# Patient Record
Sex: MALE | Race: BLACK OR AFRICAN AMERICAN
[De-identification: names, ages, dates, MRNs, and addresses within clinical notes are randomized per-mention and may not be internally consistent; named-entity substitution may affect disease eponyms.]

---

## 2018-06-27 ENCOUNTER — HOSPITAL ENCOUNTER (EMERGENCY)
Dept: HOSPITAL 74 - JERFT | Age: 23
Discharge: HOME | End: 2018-06-27
Payer: COMMERCIAL

## 2018-06-27 VITALS — TEMPERATURE: 98.1 F | HEART RATE: 85 BPM | DIASTOLIC BLOOD PRESSURE: 86 MMHG | SYSTOLIC BLOOD PRESSURE: 131 MMHG

## 2018-06-27 VITALS — BODY MASS INDEX: 30.7 KG/M2

## 2018-06-27 DIAGNOSIS — J01.10: Primary | ICD-10-CM

## 2018-06-27 NOTE — PDOC
History of Present Illness





- General


Stated Complaint: Migraine Headache


Time Seen by Provider: 06/27/18 08:38





Past History





- Past Medical History


Allergies/Adverse Reactions: 


 Allergies











Allergy/AdvReac Type Severity Reaction Status Date / Time


 


pork derived (porcine) Allergy   Verified 06/27/18 08:20











Home Medications: 


Ambulatory Orders





Naproxen [Naprosyn -] 500 mg PO BID PRN #28 tablet 06/27/18 


Triamcinolone Acetonide [Nasacort] 10.8 ml NS DAILY #1 bottle 06/27/18 








CVA: No


COPD: No





- Suicide/Smoking/Psychosocial Hx


Smoking History: Never smoked


Information on smoking cessation initiated: No


Hx Alcohol Use: No


Drug/Substance Use Hx: No


Substance Use Type: None





*Physical Exam





- Vital Signs


 Last Vital Signs











Temp Pulse Resp BP Pulse Ox


 


 98.1 F   85   16   131/86   97 


 


 06/27/18 08:20  06/27/18 08:20  06/27/18 08:20  06/27/18 08:20  06/27/18 08:20














- Physical Exam


General Appearance: Yes: Nourished, Appropriately Dressed


HEENT: positive: EOMI, SAMY, Sinus Tenderness (maxilary tenderness), Other (

bilateral cerumen impaction )


Neck: negative: Tender


Respiratory/Chest: positive: Lungs Clear, Normal Breath Sounds


Cardiovascular: positive: Regular Rhythm, Regular Rate


Gastrointestinal/Abdominal: positive: Normal Bowel Sounds, Soft


Musculoskeletal: positive: Normal Inspection


Extremity: positive: Normal Capillary Refill, Normal Inspection, Normal Range 

of Motion


Integumentary: positive: Normal Color, Dry, Warm


Neurologic: positive: Fully Oriented, Alert, Normal Mood/Affect, Normal Response

, Motor Strength 5/5, Finger to Nose (intact).  negative: Facial Droop, Numbness

, Sensory Deficit, Confused, Disoriented, Depressed Affect





Medical Decision Making





- Medical Decision Making





06/27/18 08:53


cc: left ear fullness


sinus congestion, headache woke up with symptoms today


states no fever no history of headaches or migraine


denies any drug use 


will give motrin now


dc home with strict follow up with ENT and neurology 











*DC/Admit/Observation/Transfer


Diagnosis at time of Disposition: 


Acute frontal sinusitis


Qualifiers:


 Recurrence: non-recurrent Qualified Code(s): J01.10 - Acute frontal sinusitis, 

unspecified








- Discharge Dispostion


Disposition: HOME


Condition at time of disposition: Good





- Prescriptions


Prescriptions: 


Naproxen [Naprosyn -] 500 mg PO BID PRN #28 tablet


 PRN Reason: Headache


Triamcinolone Acetonide [Nasacort] 10.8 ml NS DAILY #1 bottle





- Referrals


Referrals: 


Ghulam Raymond MD [Staff Physician] - 


Angelito Velazquez MD [Staff Physician] - 





- Patient Instructions


Additional Instructions: 


drink pleanty of water at least 2 liters a day 


take naprosyn every 12hrs for headache 


use the nasocort as directed for nasal congestion 


follow with the ENT doctor for follow up 





- Post Discharge Activity

## 2019-03-21 ENCOUNTER — HOSPITAL ENCOUNTER (EMERGENCY)
Dept: HOSPITAL 74 - JER | Age: 24
Discharge: HOME | End: 2019-03-21
Payer: COMMERCIAL

## 2019-03-21 VITALS — DIASTOLIC BLOOD PRESSURE: 68 MMHG | HEART RATE: 81 BPM | TEMPERATURE: 97.6 F | SYSTOLIC BLOOD PRESSURE: 123 MMHG

## 2019-03-21 VITALS — BODY MASS INDEX: 28.8 KG/M2

## 2019-03-21 DIAGNOSIS — R11.2: Primary | ICD-10-CM

## 2019-03-21 DIAGNOSIS — F12.90: ICD-10-CM

## 2019-03-21 LAB
ALBUMIN SERPL-MCNC: 4.5 G/DL (ref 3.4–5)
ALP SERPL-CCNC: 86 U/L (ref 45–117)
ALT SERPL-CCNC: 59 U/L (ref 13–61)
ANION GAP SERPL CALC-SCNC: 9 MMOL/L (ref 8–16)
APPEARANCE UR: CLEAR
AST SERPL-CCNC: 22 U/L (ref 15–37)
BASOPHILS # BLD: 0.9 % (ref 0–2)
BILIRUB SERPL-MCNC: 0.7 MG/DL (ref 0.2–1)
BILIRUB UR STRIP.AUTO-MCNC: NEGATIVE MG/DL
BUN SERPL-MCNC: 8 MG/DL (ref 7–18)
CALCIUM SERPL-MCNC: 9.4 MG/DL (ref 8.5–10.1)
CHLORIDE SERPL-SCNC: 104 MMOL/L (ref 98–107)
CO2 SERPL-SCNC: 26 MMOL/L (ref 21–32)
COLOR UR: YELLOW
CREAT SERPL-MCNC: 0.9 MG/DL (ref 0.55–1.3)
DEPRECATED RDW RBC AUTO: 13.5 % (ref 11.9–15.9)
EOSINOPHIL # BLD: 0.4 % (ref 0–4.5)
GLUCOSE SERPL-MCNC: 81 MG/DL (ref 74–106)
HCT VFR BLD CALC: 46.7 % (ref 35.4–49)
HGB BLD-MCNC: 16.2 GM/DL (ref 11.7–16.9)
KETONES UR QL STRIP: (no result)
LEUKOCYTE ESTERASE UR QL STRIP.AUTO: NEGATIVE
LYMPHOCYTES # BLD: 18.6 % (ref 8–40)
MAGNESIUM SERPL-MCNC: 1.9 MG/DL (ref 1.8–2.4)
MCH RBC QN AUTO: 31.3 PG (ref 25.7–33.7)
MCHC RBC AUTO-ENTMCNC: 34.7 G/DL (ref 32–35.9)
MCV RBC: 90.3 FL (ref 80–96)
MONOCYTES # BLD AUTO: 5.6 % (ref 3.8–10.2)
NEUTROPHILS # BLD: 74.5 % (ref 42.8–82.8)
NITRITE UR QL STRIP: NEGATIVE
PH UR: 7.5 [PH] (ref 5–8)
PLATELET # BLD AUTO: 223 K/MM3 (ref 134–434)
PMV BLD: 9.8 FL (ref 7.5–11.1)
POTASSIUM SERPLBLD-SCNC: 3.8 MMOL/L (ref 3.5–5.1)
PROT SERPL-MCNC: 8.3 G/DL (ref 6.4–8.2)
PROT UR QL STRIP: (no result)
PROT UR QL STRIP: NEGATIVE
RBC # BLD AUTO: 5.17 M/MM3 (ref 4–5.6)
SODIUM SERPL-SCNC: 138 MMOL/L (ref 136–145)
SP GR UR: 1.03 (ref 1.01–1.03)
UROBILINOGEN UR STRIP-MCNC: 1 MG/DL (ref 0.2–1)
WBC # BLD AUTO: 7.8 K/MM3 (ref 4–10)

## 2019-03-21 PROCEDURE — 3E023NZ INTRODUCTION OF ANALGESICS, HYPNOTICS, SEDATIVES INTO MUSCLE, PERCUTANEOUS APPROACH: ICD-10-PCS

## 2019-03-21 NOTE — PDOC
Attending Attestation





- HPI


HPI: 


The patient is a 23 year old male (current marijuana user), with no significant 

PMH, who presents to the emergency department today complaining of nausea, vomit

, and abdominal pain for one day. Patient notes sudden onset of nausea with 6 

episodes of NBNB vomit earlier today. He also endorses abdominal pain, 

intermittent and cramping in nature, with no aggravating or alleviating 

factors. Patient notes he typically smokes marijuana everyday, but has not in 

the past two days.  Patient reports history of similar symptoms 1.5 month ago 

with same causal factor (not smoking marijuana in two days). 


 


The patient denies chest pain, shortness of breath, headache and dizziness.


Denies fever, chills, diarrhea and constipation.


Denies dysuria, frequency, urgency and hematuria.


 


Allergies: Pork derived allergy 


Past surgical history: None reported 


Social history: Current marijuana user, social EtOH consumption 


PCP: Dr. Caryl Workman 





03/21/19 20:59








- Medical Decision Making


Documentation prepared by RIZWANA Napier, acting as medical scribe for 

Galindo Simons MD.


03/21/19 20:59








<Rosa Maria Jones - Last Filed: 03/21/19 20:59>





- Resident


Resident Name: Fany Isaacs





- ED Attending Attestation


I have performed the following: I have examined & evaluated the patient, The 

case was reviewed & discussed with the resident, I agree w/resident's findings 

& plan, Exceptions are as noted





- Physicial Exam


PE: 





03/21/19 21:23


Agree with exam as documented by resident


AOx3, NAD


NCAT


Neck supple


Abd soft, nt, nd, no guarding, no rebound





- Medical Decision Making








03/21/19 21:24


GERD?, AGE, cannabinoid hyperemesis, cyclic vomiting?





symptomatic tx


re-eval





Pt is symptom free and comfortable








<Galindo Simons - Last Filed: 03/21/19 21:25>

## 2019-03-21 NOTE — PDOC
History of Present Illness





- General


Chief Complaint: Nausea/Vomiting


Stated Complaint: VOMITING/NAUSEA


Time Seen by Provider: 03/21/19 19:43


History Source: Patient





- History of Present Illness


Initial Comments: 





03/21/19 20:33


The patient is a 23 year old male with no significant PMH who presents with 

acute onset of nausea, vomiting and abdominal pain.  Pain is cramping, 

intermittent with no relation to movement or PO intake.  Six episodes of NBNB 

emesis today.  Last BM was earlier today and was normal.  No fevers/chills.  

States he normally smokes marijuana daily, however has not smoked for the last 

two days.  Similar SiSx last month for which he was evaluated at an ED in 

[place].  Patient states he was given an unknown medication and his symptoms 

resolved.  Notes he is currently attempting to lose weight to improve his 

health and he lost 10 pounds over the last month as a result of physical 

exercise.





NKDA


Surgical: none reported


Social: daily marijuana, social alcohol





 








Past History





- Past Medical History


Allergies/Adverse Reactions: 


 Allergies











Allergy/AdvReac Type Severity Reaction Status Date / Time


 


pork derived (porcine) Allergy   Verified 03/21/19 19:47











Home Medications: 


Ambulatory Orders





Naproxen [Naprosyn -] 500 mg PO BID PRN #28 tablet 06/27/18 


Triamcinolone Acetonide [Nasacort] 10.8 ml NS DAILY #1 bottle 06/27/18 








CVA: No


COPD: No





- Suicide/Smoking/Psychosocial Hx


Smoking History: Never smoked


Have you smoked in the past 12 months: No


Information on smoking cessation initiated: No


Hx Alcohol Use: Yes


Drug/Substance Use Hx: No


Substance Use Type: None





**Review of Systems





- Review of Systems


Constitutional: No: Chills, Fever


HEENTM: No: Blurred Vision, Recent change in vision


Respiratory: No: Cough, Shortness of Breath


Cardiac (ROS): No: Chest Pain, Lightheadedness


ABD/GI: Yes: Nausea, Vomiting, Abdominal cramping.  No: Constipated, Diarrhea


: No: Burning, Dysuria





*Physical Exam





- Vital Signs


 Last Vital Signs











Temp Pulse Resp BP Pulse Ox


 


 97.6 F   81   17   123/68   99 


 


 03/21/19 19:45  03/21/19 19:45  03/21/19 19:45  03/21/19 19:45  03/21/19 19:45














- Physical Exam


General Appearance: Yes: Nourished, Obese


HEENT: positive: Normal Voice, Hearing Grossly Normal


Neck: positive: Trachea midline, Supple


Respiratory/Chest: positive: Lungs Clear, Normal Breath Sounds


Cardiovascular: positive: S1, S2


Gastrointestinal/Abdominal: positive: Normal Bowel Sounds, Soft.  negative: 

Hernia, Mass


Musculoskeletal: negative: CVA Tenderness (R), CVA Tenderness (L)


Extremity: positive: Normal Capillary Refill, Normal Inspection


Integumentary: positive: Normal Color, Dry, Warm


Neurologic: positive: Fully Oriented, Alert





Moderate Sedation





- Procedure Monitoring


Vital Signs: 


Procedure Monitoring Vital Signs











Temperature  97.6 F   03/21/19 19:45


 


Pulse Rate  81   03/21/19 19:45


 


Respiratory Rate  17   03/21/19 19:45


 


Blood Pressure  123/68   03/21/19 19:45


 


O2 Sat by Pulse Oximetry (%)  99   03/21/19 19:45











ED Treatment Course





- LABORATORY


CBC & Chemistry Diagram: 


 03/21/19 19:51





 03/21/19 19:51





- ADDITIONAL ORDERS


Additional order review: 


 











  03/21/19





  19:51


 


RBC  5.17


 


MCV  90.3


 


MCHC  34.7


 


RDW  13.5


 


MPV  9.8


 


Neutrophils %  74.5


 


Lymphocytes %  18.6  D


 


Monocytes %  5.6


 


Eosinophils %  0.4  D


 


Basophils %  0.9














Medical Decision Making





- Medical Decision Making





03/21/19 20:36


23 year old male with acute onset of vomiting and abdominal cramping.  VS 

unremarkable.  Belly soft, non-tender, (+) bowel sounds.  H/o recent marijuana 

cessation.  Frontal diagnosis: cyclic vomiting syndrome vs. gastritis vs. 

gastroenteritis.  Low clinical suspicion for acute abdomen.





03/21/19 21:09


S/p Haldol, patient symptomatically improved


Tolerating PO intake, ambulatory.


Requests d/c home.








03/21/19 21:10


UA shows 2+ ketones c/w decreased PO intake


CBC, CMP unremarkable


Will discharge home with return precautions, marijuana cessation counseling and 

instruction to follow-up with his PMD for further evaluation.





I discussed the physical exam findings, ancillary test results and final 

diagnoses with the patient. I answered all of the patient's questions. The 

patient was satisfied with the care received and felt comfortable with the 

discharge plan and treatment plan. The patient will return to the Emergency 

Department with any new, persistent or worsening symptoms.





*DC/Admit/Observation/Transfer


Diagnosis at time of Disposition: 


 Nausea








- Discharge Dispostion


Disposition: HOME


Condition at time of disposition: Good





- Referrals


Referrals: 


Caryl Workman MD [Primary Care Provider] - 





- Patient Instructions


Printed Discharge Instructions:  DI for Vomiting -- Adult


Additional Instructions: 


Your symptoms are likely a result of marijuana use.


All of your labs and a test of your urine showed no concerning findings.


At this time you are safe for discharge home.





Drink plenty of water and advance your diet as tolerated.





Return to the Emergency Department for any new/worsening/concerning symptoms.











- Post Discharge Activity

## 2019-03-21 NOTE — PDOC
Rapid Medical Evaluation


Time Seen by Provider: 03/21/19 19:43


Medical Evaluation: 


 Allergies











Allergy/AdvReac Type Severity Reaction Status Date / Time


 


pork derived (porcine) Allergy   Verified 06/27/18 08:20











03/21/19 19:44


Pt c/o: nausea and body aches x 1  1/2 months, 10 lb weight loss


Pt on brief exam: vss


Pt ordered for: labs, and urine


Pt to proceed to the ED 


03/21/19 19:46








**Discharge Disposition





- Diagnosis


 Nausea








- Referrals





- Patient Instructions





- Post Discharge Activity

## 2019-06-06 ENCOUNTER — HOSPITAL ENCOUNTER (EMERGENCY)
Dept: HOSPITAL 74 - JER | Age: 24
Discharge: HOME | End: 2019-06-06
Payer: COMMERCIAL

## 2020-10-06 ENCOUNTER — HOSPITAL ENCOUNTER (EMERGENCY)
Dept: HOSPITAL 74 - JER | Age: 25
Discharge: HOME | End: 2020-10-06
Payer: COMMERCIAL

## 2020-10-06 VITALS — SYSTOLIC BLOOD PRESSURE: 114 MMHG | DIASTOLIC BLOOD PRESSURE: 73 MMHG | HEART RATE: 58 BPM | TEMPERATURE: 97.8 F

## 2020-10-06 VITALS — BODY MASS INDEX: 38.7 KG/M2

## 2020-10-06 DIAGNOSIS — R10.84: Primary | ICD-10-CM

## 2020-10-06 DIAGNOSIS — R11.2: ICD-10-CM

## 2020-10-06 LAB
ALBUMIN SERPL-MCNC: 4.2 G/DL (ref 3.4–5)
ALP SERPL-CCNC: 65 U/L (ref 45–117)
ALT SERPL-CCNC: 40 U/L (ref 13–61)
ANION GAP SERPL CALC-SCNC: 5 MMOL/L (ref 8–16)
AST SERPL-CCNC: 18 U/L (ref 15–37)
BASOPHILS # BLD: 0.6 % (ref 0–2)
BILIRUB SERPL-MCNC: 0.7 MG/DL (ref 0.2–1)
BUN SERPL-MCNC: 13 MG/DL (ref 7–18)
CALCIUM SERPL-MCNC: 9.6 MG/DL (ref 8.5–10.1)
CHLORIDE SERPL-SCNC: 108 MMOL/L (ref 98–107)
CO2 SERPL-SCNC: 29 MMOL/L (ref 21–32)
CREAT SERPL-MCNC: 0.9 MG/DL (ref 0.55–1.3)
DEPRECATED RDW RBC AUTO: 13.5 % (ref 11.9–15.9)
EOSINOPHIL # BLD: 1.1 % (ref 0–4.5)
GLUCOSE SERPL-MCNC: 100 MG/DL (ref 74–106)
HCT VFR BLD CALC: 47.2 % (ref 35.4–49)
HGB BLD-MCNC: 15.8 GM/DL (ref 11.7–16.9)
LIPASE SERPL-CCNC: 137 U/L (ref 73–393)
LYMPHOCYTES # BLD: 12 % (ref 8–40)
MCH RBC QN AUTO: 30.6 PG (ref 25.7–33.7)
MCHC RBC AUTO-ENTMCNC: 33.5 G/DL (ref 32–35.9)
MCV RBC: 91.2 FL (ref 80–96)
MONOCYTES # BLD AUTO: 3.9 % (ref 3.8–10.2)
NEUTROPHILS # BLD: 82.4 % (ref 42.8–82.8)
PLATELET # BLD AUTO: 213 K/MM3 (ref 134–434)
PMV BLD: 9.4 FL (ref 7.5–11.1)
POTASSIUM SERPLBLD-SCNC: 4.2 MMOL/L (ref 3.5–5.1)
PROT SERPL-MCNC: 7.9 G/DL (ref 6.4–8.2)
RBC # BLD AUTO: 5.17 M/MM3 (ref 4–5.6)
SODIUM SERPL-SCNC: 142 MMOL/L (ref 136–145)
WBC # BLD AUTO: 8.5 K/MM3 (ref 4–10)

## 2020-10-06 PROCEDURE — 3E033GC INTRODUCTION OF OTHER THERAPEUTIC SUBSTANCE INTO PERIPHERAL VEIN, PERCUTANEOUS APPROACH: ICD-10-PCS

## 2020-10-06 PROCEDURE — 3E0337Z INTRODUCTION OF ELECTROLYTIC AND WATER BALANCE SUBSTANCE INTO PERIPHERAL VEIN, PERCUTANEOUS APPROACH: ICD-10-PCS

## 2020-10-06 PROCEDURE — 3E0333Z INTRODUCTION OF ANTI-INFLAMMATORY INTO PERIPHERAL VEIN, PERCUTANEOUS APPROACH: ICD-10-PCS

## 2020-10-06 NOTE — PDOC
History of Present Illness





- General


Chief Complaint: Pain, Acute


Stated Complaint: ABDOMINAL PAIN VOMITING


Time Seen by Provider: 10/06/20 09:56


History Source: Patient


Exam Limitations: No Limitations





- History of Present Illness


Initial Comments: 





10/06/20 11:18


24y M with PMH of cyclic vomiting, GERD presenting to the ER for n/v and 

abdominal pain that started at 5am. Pt stated he had cow liver for dinner. 

Denies fever, chills, diarrhea, constipation, chest pain, sob, back pain, 

hematuria, dysuria. He feels a burning sensation in the chest. Has not taken any

medications for his symptoms. States he smokes marijuana but last smoked around 

5 days ago. Denies alcohol use. 





Past History





- Medical History


Allergies/Adverse Reactions: 


                                    Allergies











Allergy/AdvReac Type Severity Reaction Status Date / Time


 


pork derived (porcine) Allergy   Verified 06/05/20 12:48











Home Medications: 


Ambulatory Orders





Ondansetron [Zofran -] 4 mg PO TID PRN #21 tablet 06/05/20 


Famotidine [Pepcid -] 20 mg PO BID #14 tablet 10/06/20 


Ondansetron [Zofran *Odt*] 4 mg SL BID #14 od.tablet 10/06/20 








CVA: No


COPD: No





- Immunization History


Immunization Up to Date: Yes





- Psycho-Social/Smoking History


Smoking History: Never smoked


Have you smoked in the past 12 months: No


Information on smoking cessation initiated: No





- Substance Abuse Hx (Audit-C & DAST Scrn)


How often the patient has a drink containing alcohol: Never


Score: In Men: 4 or > Positive; In Women: 3 or > Positive: 0


Screen Result (Pos requires Nsg. Audit-10AR): Negative


In the last yr the pt used illegal drug/Rx for NonMed reason: No


Score:  Yes response is considered Positive: 0


Screen Result (Positive result requires Nsg. DAST-10): Negative





**Review of Systems





- Review of Systems


Constitutional: No: Symptoms Reported


HEENTM: No: Symptoms Reported


Respiratory: No: Symptoms reported


Cardiac (ROS): No: Symptoms Reported


ABD/GI: Yes: See HPI


: No: Symptoms Reported


Musculoskeletal: No: Symptoms Reported


Integumentary: No: Symptoms Reported


Neurological: No: Symptoms reported





*Physical Exam





- Vital Signs


                                Last Vital Signs











Temp Pulse Resp BP Pulse Ox


 


 97.8 F   58 L  18   114/73   96 


 


 10/06/20 09:34  10/06/20 09:34  10/06/20 09:34  10/06/20 09:34  10/06/20 09:34














- Physical Exam


General Appearance: Yes: Nourished, Appropriately Dressed.  No: Apparent 

Distress


HEENT: positive: EOMI, SAMY, Normal ENT Inspection


Neck: positive: Trachea midline, Supple.  negative: Lymphadenopathy (R), 

Lymphadenopathy (L)


Respiratory/Chest: positive: Lungs Clear, Normal Breath Sounds.  negative: 

Crackles, Rales, Rhonchi, Stridor, Wheezing


Cardiovascular: positive: Regular Rhythm, Regular Rate, S1, S2.  negative: 

Edema, JVD, Murmur


Vascular Pulses: Dorsalis-Pedis (R): 2+, Doralis-Pedis (L): 2+


Gastrointestinal/Abdominal: positive: Normal Bowel Sounds, Soft.  negative: 

Tender, Distended, Guarding, Rebound, Hernia


Musculoskeletal: negative: CVA Tenderness


Extremity: positive: Normal Capillary Refill.  negative: Pedal Edema, Swelling, 

Calf Tenderness


Integumentary: positive: Normal Color, Dry, Warm


Neurologic: positive: CNs II-XII NML intact, Fully Oriented, Alert, Normal 

Mood/Affect, Normal Response, Motor Strength 5/5





ED Treatment Course





- LABORATORY


CBC & Chemistry Diagram: 


                                 10/06/20 10:15





                                 10/06/20 10:36





Medical Decision Making





- Medical Decision Making





10/06/20 12:49


24y m presenting for abdominal pain, n/v. 


vitals wnl


pe: normal. 





ddx includes gastritis/gerd, ge, cholecystitis although less likely, 

pancreatitis. Low suspicon for sbo. 


-cbc, cmp, lipase


-fluids, zofran, ofirmev, pepcid, maalox. 








upon reassessment, pt reports feeling better. Tolerated po apple juice. 

Requesting to go home. 


will advise to f/u with gi. rx for zofran and pepcid sent. discussed care 

instructions. return precautions provided. 





Discharge





- Discharge Information


Problems reviewed: Yes


Clinical Impression/Diagnosis: 


Abdominal pain


Qualifiers:


 Abdominal location: generalized Qualified Code(s): R10.84 - Generalized 

abdominal pain





Vomiting


Qualifiers:


 Vomiting type: unspecified Vomiting Intractability: non-intractable Nausea 

presence: with nausea Qualified Code(s): R11.2 - Nausea with vomiting, 

unspecified





Condition: Improved


Disposition: HOME





- Admission


No





- Additional Discharge Information


Prescriptions: 


Famotidine [Pepcid -] 20 mg PO BID #14 tablet


Ondansetron [Zofran *Odt*] 4 mg SL BID #14 od.tablet





- Follow up/Referral


Referrals: 


Caryl Workman MD [Primary Care Provider] - 


Bartolo Raphael MD [Staff Physician] - 


Stevie Hylton MD [Staff Physician] - 


Eugene Elder MD [Staff Physician] - 


Tonia Chauhan DO [Staff Physician] - 





- Patient Discharge Instructions


Patient Printed Discharge Instructions:  DI for Gastritis


Additional Instructions: 


You were seen in the ER for abdominal pain, nausea and vomiting. The blood tests

are all normal. Your symptoms are likely due to gastritis (irritation of the 

stomach lining). 





A prescription for an anti-nausea medication and for acid was sent to [place] 

[place]acy [place]. Take as directed. 


Stay well hydrated. Refrain from eating spicy, fried, fatty, acidic foods. Do 

not drink coffee or alcohol. Stick to a clear liquid diet for today and 

tomorrow. You can move on to soft foods like bananas, toast and rice once you 

can tolerate that. You can resume a normal diet once you can tolerate the soft 

solids. 





Come back to the ER if you have worsening pain, are continuously vomiting, are 

not having bowel movements or passing gas, or if any new or concerning symptom 

develops. 





Thank you





- Post Discharge Activity

## 2020-10-06 NOTE — PDOC
Attending Attestation





- Resident


Resident Name: Jena Jaime





- ED Attending Attestation


I have performed the following: I have examined & evaluated the patient, The 

case was reviewed & discussed with the resident, I agree w/resident's findings &

plan, Exceptions are as noted





- HPI


HPI: 





23 yo M history cyclic vomiting, GERD presents with N/V, abd pain since 5am. He 

states he ate liver for dinner last night, it was oily. Denies dysuria, back 

pain, chest pain, fever, diarrhea. He has had similar symptoms in the past, but 

not at severe. 











- Physicial Exam


PE: 





GENERAL: Awake, alert, and fully oriented, in no acute distress. Appears 

uncomfortable


HEAD: No signs of trauma


EYES: PERRLA, EOMI, sclera anicteric, conjunctiva clear


ENT: Auricles normal inspection, hearing grossly normal, nares patent, 

oropharynx clear without exudates. Moist mucosa


NECK: Normal ROM, supple, no lymphadenopathy, JVD, or masses


LUNGS: Breath sounds equal, clear to auscultation bilaterally. No wheezes, and 

no crackles


HEART: Regular rate and rhythm, normal S1 and S2, no murmurs, rubs or gallops


ABDOMEN: Soft, mild epigastric tenderness, normoactive bowel sounds. No 

guarding, no rebound. No masses


EXTREMITIES: Normal range of motion, no edema. No clubbing or cyanosis. No 

cords, erythema, or tenderness


NEUROLOGICAL: Cranial nerves II through XII grossly intact. Normal speech, 

normal gait. Motor and sensation intact


SKIN: Warm, dry, normal turgor, no rashes or lesions noted. 








- Medical Decision Making





Pt with upper abd discomfort, GI symptoms after eating greasy meal last night. 

Improved with GI cocktail, stable for DC home. 








Discharge





- Discharge Information


Problems reviewed: Yes


Clinical Impression/Diagnosis: 


Abdominal pain


Qualifiers:


 Abdominal location: generalized Qualified Code(s): R10.84 - Generalized 

abdominal pain





Vomiting


Qualifiers:


 Vomiting type: unspecified Vomiting Intractability: non-intractable Nausea 

presence: with nausea Qualified Code(s): R11.2 - Nausea with vomiting, 

unspecified





Condition: Improved


Disposition: HOME





- Additional Discharge Information


Prescriptions: 


Famotidine [Pepcid -] 20 mg PO BID #14 tablet


Ondansetron [Zofran *Odt*] 4 mg SL BID #14 od.tablet





- Follow up/Referral


Referrals: 


Caryl Workman MD [Primary Care Provider] - 


Stevie Hylton MD [Staff Physician] - 


Bartolo Raphael MD [Staff Physician] - 


Tonia Chauhan DO [Staff Physician] - 


Eugene Elder MD [Staff Physician] - 





- Patient Discharge Instructions


Patient Printed Discharge Instructions:  DI for Gastritis


Additional Instructions: 


You were seen in the ER for abdominal pain, nausea and vomiting. The blood tests

are all normal. Your symptoms are likely due to gastritis (irritation of the 

stomach lining). 





A prescription for an anti-nausea medication and for acid was sent to the 

pharmacy [place]. Take as directed. 


Stay well hydrated. Refrain from eating spicy, fried, fatty, acidic foods. Do 

not drink coffee or alcohol. Stick to a clear liquid diet for today and 

tomorrow. You can move on to soft foods like bananas, toast and rice once you 

can tolerate that. You can resume a normal diet once you can tolerate the soft 

solids. 





Come back to the ER if you have worsening pain, are continuously vomiting, are 

not having bowel movements or passing gas, or if any new or concerning symptom 

develops. 





Thank you





- Post Discharge Activity

## 2020-11-26 ENCOUNTER — HOSPITAL ENCOUNTER (EMERGENCY)
Dept: HOSPITAL 74 - JER | Age: 25
Discharge: HOME | End: 2020-11-26
Payer: COMMERCIAL

## 2020-11-26 VITALS — TEMPERATURE: 96.8 F | SYSTOLIC BLOOD PRESSURE: 118 MMHG | DIASTOLIC BLOOD PRESSURE: 76 MMHG | HEART RATE: 58 BPM

## 2020-11-26 VITALS — BODY MASS INDEX: 26.7 KG/M2

## 2020-11-26 DIAGNOSIS — H61.23: Primary | ICD-10-CM

## 2022-06-10 ENCOUNTER — HOSPITAL ENCOUNTER (EMERGENCY)
Dept: HOSPITAL 74 - JER | Age: 27
Discharge: HOME | End: 2022-06-10
Payer: COMMERCIAL

## 2022-06-10 VITALS — SYSTOLIC BLOOD PRESSURE: 112 MMHG | TEMPERATURE: 97.3 F | DIASTOLIC BLOOD PRESSURE: 74 MMHG | HEART RATE: 77 BPM

## 2022-06-10 VITALS — BODY MASS INDEX: 27.5 KG/M2

## 2022-06-10 DIAGNOSIS — U07.1: Primary | ICD-10-CM

## 2022-12-07 ENCOUNTER — HOSPITAL ENCOUNTER (EMERGENCY)
Dept: HOSPITAL 74 - JER | Age: 27
Discharge: HOME | End: 2022-12-07
Payer: COMMERCIAL

## 2022-12-07 VITALS
DIASTOLIC BLOOD PRESSURE: 69 MMHG | RESPIRATION RATE: 18 BRPM | TEMPERATURE: 99 F | SYSTOLIC BLOOD PRESSURE: 103 MMHG | HEART RATE: 84 BPM

## 2022-12-07 VITALS — BODY MASS INDEX: 25.7 KG/M2

## 2022-12-07 DIAGNOSIS — J09.X2: Primary | ICD-10-CM

## 2022-12-07 DIAGNOSIS — R11.2: ICD-10-CM

## 2022-12-07 LAB
ALBUMIN SERPL-MCNC: 4.5 G/DL (ref 3.4–5)
ALP SERPL-CCNC: 76 U/L (ref 45–117)
ALT SERPL-CCNC: 65 U/L (ref 13–61)
ANION GAP SERPL CALC-SCNC: 9 MMOL/L (ref 8–16)
AST SERPL-CCNC: 46 U/L (ref 15–37)
BILIRUB SERPL-MCNC: 1.1 MG/DL (ref 0.2–1)
BUN SERPL-MCNC: 12.2 MG/DL (ref 7–18)
CALCIUM SERPL-MCNC: 9.8 MG/DL (ref 8.5–10.1)
CHLORIDE SERPL-SCNC: 103 MMOL/L (ref 98–107)
CO2 SERPL-SCNC: 27 MMOL/L (ref 21–32)
CREAT SERPL-MCNC: 1.1 MG/DL (ref 0.55–1.3)
DEPRECATED RDW RBC AUTO: 13.9 % (ref 11.9–15.9)
GLUCOSE SERPL-MCNC: 102 MG/DL (ref 74–106)
HCT VFR BLD CALC: 48.7 % (ref 35.4–49)
HGB BLD-MCNC: 16.2 GM/DL (ref 11.7–16.9)
MAGNESIUM SERPL-MCNC: 2.1 MG/DL (ref 1.8–2.4)
MCH RBC QN AUTO: 30.5 PG (ref 25.7–33.7)
MCHC RBC AUTO-ENTMCNC: 33.3 G/DL (ref 32–35.9)
MCV RBC: 91.7 FL (ref 80–96)
PLATELET # BLD AUTO: 206 10^3/UL (ref 134–434)
PMV BLD: 9.3 FL (ref 7.5–11.1)
PROT SERPL-MCNC: 8.2 G/DL (ref 6.4–8.2)
RBC # BLD AUTO: 5.31 M/MM3 (ref 4–5.6)
SODIUM SERPL-SCNC: 139 MMOL/L (ref 136–145)
WBC # BLD AUTO: 6.4 K/MM3 (ref 4–10)

## 2022-12-07 PROCEDURE — 3E033GC INTRODUCTION OF OTHER THERAPEUTIC SUBSTANCE INTO PERIPHERAL VEIN, PERCUTANEOUS APPROACH: ICD-10-PCS
